# Patient Record
Sex: FEMALE | Race: WHITE | NOT HISPANIC OR LATINO | URBAN - NONMETROPOLITAN AREA
[De-identification: names, ages, dates, MRNs, and addresses within clinical notes are randomized per-mention and may not be internally consistent; named-entity substitution may affect disease eponyms.]

---

## 2023-06-13 ENCOUNTER — APPOINTMENT (OUTPATIENT)
Dept: URBAN - NONMETROPOLITAN AREA SURGERY 8 | Age: 23
Setting detail: DERMATOLOGY
End: 2023-06-13

## 2023-06-13 DIAGNOSIS — D485 NEOPLASM OF UNCERTAIN BEHAVIOR OF SKIN: ICD-10-CM

## 2023-06-13 DIAGNOSIS — R21 RASH AND OTHER NONSPECIFIC SKIN ERUPTION: ICD-10-CM

## 2023-06-13 DIAGNOSIS — D22 MELANOCYTIC NEVI: ICD-10-CM

## 2023-06-13 PROBLEM — D48.5 NEOPLASM OF UNCERTAIN BEHAVIOR OF SKIN: Status: ACTIVE | Noted: 2023-06-13

## 2023-06-13 PROBLEM — D22.5 MELANOCYTIC NEVI OF TRUNK: Status: ACTIVE | Noted: 2023-06-13

## 2023-06-13 PROCEDURE — OTHER ADDITIONAL NOTES: OTHER

## 2023-06-13 PROCEDURE — 99203 OFFICE O/P NEW LOW 30 MIN: CPT | Mod: 25

## 2023-06-13 PROCEDURE — OTHER COUNSELING: OTHER

## 2023-06-13 PROCEDURE — 11102 TANGNTL BX SKIN SINGLE LES: CPT

## 2023-06-13 PROCEDURE — OTHER MIPS QUALITY: OTHER

## 2023-06-13 PROCEDURE — OTHER PRESCRIPTION: OTHER

## 2023-06-13 PROCEDURE — OTHER BIOPSY BY SHAVE METHOD: OTHER

## 2023-06-13 RX ORDER — TRIAMCINOLONE ACETONIDE 1 MG/G
CREAM TOPICAL BID
Qty: 80 | Refills: 2 | Status: ERX | COMMUNITY
Start: 2023-06-13

## 2023-06-13 ASSESSMENT — LOCATION DETAILED DESCRIPTION DERM
LOCATION DETAILED: LEFT LATERAL ABDOMEN
LOCATION DETAILED: LEFT INFERIOR LATERAL UPPER BACK
LOCATION DETAILED: RIGHT ANTERIOR DISTAL THIGH
LOCATION DETAILED: LEFT INFERIOR LATERAL UPPER BACK
LOCATION DETAILED: LEFT ANTERIOR DISTAL THIGH
LOCATION DETAILED: LEFT MID-UPPER BACK

## 2023-06-13 ASSESSMENT — LOCATION ZONE DERM
LOCATION ZONE: TRUNK
LOCATION ZONE: LEG
LOCATION ZONE: TRUNK

## 2023-06-13 ASSESSMENT — LOCATION SIMPLE DESCRIPTION DERM
LOCATION SIMPLE: RIGHT THIGH
LOCATION SIMPLE: LEFT UPPER BACK
LOCATION SIMPLE: LEFT THIGH
LOCATION SIMPLE: LEFT UPPER BACK
LOCATION SIMPLE: ABDOMEN

## 2023-06-13 NOTE — PROCEDURE: ADDITIONAL NOTES
Additional Notes: No central clearing of lesions, no nail pitting. No rash noted, in umbilicus or axilla.
Detail Level: Zone
Render Risk Assessment In Note?: no

## 2023-06-13 NOTE — HPI: RASH
How Severe Is Your Rash?: mild
Is This A New Presentation, Or A Follow-Up?: Rash
Additional History: Pt has only been seen for this by urgent care and was told it was folliculitis, she was given keflex with no improvement.

## 2023-06-20 ENCOUNTER — APPOINTMENT (OUTPATIENT)
Dept: URBAN - NONMETROPOLITAN AREA SURGERY 8 | Age: 23
Setting detail: DERMATOLOGY
End: 2023-06-20

## 2023-06-20 DIAGNOSIS — L40.4 GUTTATE PSORIASIS: ICD-10-CM

## 2023-06-20 PROCEDURE — OTHER ORDER TESTS: OTHER

## 2023-06-20 NOTE — PROCEDURE: ORDER TESTS
Performing Laboratory: 0
Expected Date Of Service: 06/20/2023
Billing Type: Third-Party Bill
Bill For Surgical Tray: no

## 2023-06-29 ENCOUNTER — APPOINTMENT (OUTPATIENT)
Dept: URBAN - NONMETROPOLITAN AREA SURGERY 8 | Age: 23
Setting detail: DERMATOLOGY
End: 2023-06-29

## 2023-06-29 DIAGNOSIS — L40.4 GUTTATE PSORIASIS: ICD-10-CM

## 2023-06-29 PROCEDURE — OTHER ADDITIONAL NOTES: OTHER

## 2023-06-29 PROCEDURE — OTHER COUNSELING: OTHER

## 2023-06-29 PROCEDURE — 99213 OFFICE O/P EST LOW 20 MIN: CPT

## 2023-06-29 PROCEDURE — OTHER ORDER TESTS: OTHER

## 2023-06-29 PROCEDURE — OTHER MIPS QUALITY: OTHER

## 2023-06-29 NOTE — HPI: RASH (PSORIASIS)
Is This A New Presentation, Or A Follow-Up?: Follow Up Psoriasis
Additional History: Patient had negative strep throat culture

## 2023-06-29 NOTE — PROCEDURE: ADDITIONAL NOTES
Detail Level: Zone
Render Risk Assessment In Note?: no
Additional Notes: Will start oral antibiotics if strep positive

## 2023-06-29 NOTE — PROCEDURE: ORDER TESTS
Expected Date Of Service: 06/29/2023
Performing Laboratory: 0
Bill For Surgical Tray: no
Billing Type: Third-Party Bill